# Patient Record
Sex: MALE | Race: WHITE | NOT HISPANIC OR LATINO | Employment: FULL TIME | URBAN - METROPOLITAN AREA
[De-identification: names, ages, dates, MRNs, and addresses within clinical notes are randomized per-mention and may not be internally consistent; named-entity substitution may affect disease eponyms.]

---

## 2020-10-21 ENCOUNTER — OFFICE VISIT (OUTPATIENT)
Dept: NEUROSURGERY | Facility: CLINIC | Age: 45
End: 2020-10-21

## 2020-10-21 VITALS — WEIGHT: 285 LBS | HEIGHT: 71 IN | TEMPERATURE: 96.8 F | BODY MASS INDEX: 39.9 KG/M2

## 2020-10-21 DIAGNOSIS — M54.9 MECHANICAL BACK PAIN: Primary | ICD-10-CM

## 2020-10-21 DIAGNOSIS — M51.36 DDD (DEGENERATIVE DISC DISEASE), LUMBAR: ICD-10-CM

## 2020-10-21 DIAGNOSIS — M47.819 FACET ARTHROPATHY: ICD-10-CM

## 2020-10-21 PROCEDURE — 99243 OFF/OP CNSLTJ NEW/EST LOW 30: CPT | Performed by: NEUROLOGICAL SURGERY

## 2020-10-21 RX ORDER — CYCLOBENZAPRINE HCL 5 MG
5 TABLET ORAL 2 TIMES DAILY
COMMUNITY
Start: 2020-09-24

## 2020-10-21 RX ORDER — METOPROLOL SUCCINATE 25 MG/1
25 TABLET, EXTENDED RELEASE ORAL DAILY
COMMUNITY
Start: 2020-08-28

## 2020-10-21 RX ORDER — MELOXICAM 15 MG/1
15 TABLET ORAL DAILY
COMMUNITY
Start: 2020-09-22

## 2020-10-21 NOTE — PROGRESS NOTES
"Patient: Gavino Reece  : 1975    Primary Care Provider: Mick Wilkes MD    Requesting Provider: As above        History    Chief Complaint: Chronic low back pain.    History of Present Illness: Mr. Reece is a 45-year-old  who suffered a work injury in 2003.  He has had ongoing periodic flareups of his back pain.  In the past he has had injections, therapy, chiropractic treatment.  His most recent flareup occurred at the beginning of September.  This was one of his most severe flareups.  At that time he was having some leg pain, left greater than right.  His legs were giving out on him.  His low back pain has increased.  For years he has had some intermittent \"pulsating\" in the right lateral calf.  With the current spell he is taken medicines including NSAIDs, Ultracet, and Flexeril.  He has utilized ice and a TENS unit.  Symptoms are worse with protracted standing or sitting.  Nothing really makes him feel better.  His flareup has settled down.  He is simply tired of his repetitive flareups.  He has no family history of back trouble.    Review of Systems   Constitutional: Positive for activity change. Negative for appetite change, chills, diaphoresis, fatigue, fever and unexpected weight change.   HENT: Negative for congestion, dental problem, drooling, ear discharge, ear pain, facial swelling, hearing loss, mouth sores, nosebleeds, postnasal drip, rhinorrhea, sinus pressure, sinus pain, sneezing, sore throat, tinnitus, trouble swallowing and voice change.    Eyes: Negative for photophobia, pain, discharge, redness, itching and visual disturbance.   Respiratory: Negative for apnea, cough, choking, chest tightness, shortness of breath, wheezing and stridor.    Cardiovascular: Negative for chest pain, palpitations and leg swelling.   Gastrointestinal: Negative for abdominal distention, abdominal pain, anal bleeding, blood in stool, constipation, diarrhea, nausea, " "rectal pain and vomiting.   Endocrine: Negative for cold intolerance, heat intolerance, polydipsia, polyphagia and polyuria.   Genitourinary: Negative for decreased urine volume, difficulty urinating, discharge, dysuria, enuresis, flank pain, frequency, genital sores, hematuria, penile pain, penile swelling, scrotal swelling, testicular pain and urgency.   Musculoskeletal: Positive for back pain. Negative for arthralgias, gait problem, joint swelling, myalgias, neck pain and neck stiffness.   Skin: Negative for color change, pallor, rash and wound.   Allergic/Immunologic: Negative for environmental allergies, food allergies and immunocompromised state.   Neurological: Positive for weakness and numbness. Negative for dizziness, tremors, seizures, syncope, facial asymmetry, speech difficulty, light-headedness and headaches.   Hematological: Negative for adenopathy. Does not bruise/bleed easily.   Psychiatric/Behavioral: Positive for agitation, behavioral problems, decreased concentration and sleep disturbance. Negative for confusion, dysphoric mood, hallucinations, self-injury and suicidal ideas. The patient is nervous/anxious. The patient is not hyperactive.        The patient's past medical history, past surgical history, family history, and social history have been reviewed at length in the electronic medical record.    Physical Exam:   Temp 96.8 °F (36 °C)   Ht 180.3 cm (71\")   Wt 129 kg (285 lb)   BMI 39.75 kg/m²   CONSTITUTIONAL: Patient is well-nourished, pleasant and appears stated age.  CV: Heart regular rate and rhythm without murmur, rub, or gallop.  PULMONARY: Lungs are clear to ascultation.  MUSCULOSKELETAL:  Straight leg raising is negative.  Fernando's Sign is negative.  ROM in back is mildly limited in all directions.  Tenderness in the back to palpation is present in the lumbosacral midline.  NEUROLOGICAL:  Orientation, memory, attention span, language function, and cognition have been examined and " are intact.  Strength is intact in the lower extremities to direct testing.  Muscle tone is normal throughout.  Station and gait are normal.  Sensation is intact to light touch testing throughout.  Deep tendon reflexes are 1+ and symmetrical.  Coordination is intact.      Medical Decision Making    Data Review:   MRI of the lumbar spine demonstrates multilevel degenerative disc disease and facet arthropathy.  There is a prominent left-sided disc protrusion at L3-4.  There are some central disc bulging at L4-5 and modest disc bulging centrally at L5-S1.    Diagnosis:   1.  Mechanical low back pain.  2.  Lumbar radiculopathy, improved.  3.  Lumbar degenerative disc disease.  4.  Lumbar degenerative joint disease.    Treatment Options:   The patient's main complaint is his axial back pain.  The leg symptoms have dissipated and seem to be secondary.  Unfortunately, I do not have a solution for his chronic mechanical low back pain.  Treatment will need to remain symptomatic.  If his symptoms progress then I will be happy to reevaluate him.       Diagnosis Plan   1. Mechanical back pain     2. DDD (degenerative disc disease), lumbar     3. Facet arthropathy         Scribed for Mandeep Vargas MD by Zabrina Gold CMA on 10/21/2020 17:10 EDT       I, Dr. Vargas, personally performed the services described in the documentation, as scribed in my presence, and it is both accurate and complete.